# Patient Record
Sex: FEMALE | NOT HISPANIC OR LATINO | URBAN - NONMETROPOLITAN AREA
[De-identification: names, ages, dates, MRNs, and addresses within clinical notes are randomized per-mention and may not be internally consistent; named-entity substitution may affect disease eponyms.]

---

## 2018-02-15 ENCOUNTER — OFFICE VISIT CONVERTED (OUTPATIENT)
Dept: FAMILY MEDICINE CLINIC | Age: 63
End: 2018-02-15
Attending: NURSE PRACTITIONER

## 2018-06-28 ENCOUNTER — OFFICE VISIT CONVERTED (OUTPATIENT)
Dept: FAMILY MEDICINE CLINIC | Age: 63
End: 2018-06-28
Attending: NURSE PRACTITIONER

## 2018-09-28 ENCOUNTER — OFFICE VISIT CONVERTED (OUTPATIENT)
Dept: FAMILY MEDICINE CLINIC | Age: 63
End: 2018-09-28
Attending: NURSE PRACTITIONER

## 2018-10-19 ENCOUNTER — OFFICE VISIT CONVERTED (OUTPATIENT)
Dept: FAMILY MEDICINE CLINIC | Age: 63
End: 2018-10-19
Attending: NURSE PRACTITIONER

## 2018-12-27 ENCOUNTER — OFFICE VISIT CONVERTED (OUTPATIENT)
Dept: FAMILY MEDICINE CLINIC | Age: 63
End: 2018-12-27
Attending: NURSE PRACTITIONER

## 2021-05-18 NOTE — PROGRESS NOTES
Freda Jackson 1955     Office/Outpatient Visit    Visit Date: Fri, Oct 19, 2018 10:18 am    Provider: Freda Dasilva N.P. (Assistant: Shaista Marr MA)    Location: [facility]        Electronically signed by Freda Dasilva N.P. on  10/19/2018 11:05:42 AM                             SUBJECTIVE:        CC:     Juhi is a 62 year old White female.  This is a follow-up visit.  patient presents today for follow up;         HPI:         Patient to be evaluated for hypertension.  Pt states doing well on med. Her bp at home was better than today's. She is usually 120;s systolic.          Concerning essential hypercholesterolemia, pt states doing well on med. Here for f/u and refills.          Type 2 diabetes details; pt states blood sugar has been around 120's. Doing well on med. Here for f/u and refills.      ROS:     CONSTITUTIONAL:  Negative for chills, fatigue, fever, and weight change.      EYES:  Negative for blurred vision.      CARDIOVASCULAR:  Negative for chest pain, orthopnea, paroxysmal nocturnal dyspnea and pedal edema.      RESPIRATORY:  Negative for dyspnea.      GASTROINTESTINAL:  Negative for abdominal pain, constipation, diarrhea, nausea and vomiting.      NEUROLOGICAL:  Negative for dizziness, headaches, paresthesias, and weakness.      PSYCHIATRIC:  Negative for anxiety, depression, and sleep disturbances.          PMH/FMH/SH:     Last Reviewed on 10/19/2018 10:37 AM by Freda Dasilva    Past Medical History:          Menopause without estrogen replacement         Surgical History:         Cholecystectomy: at age 28;     BTL;    hysterectomy;      C section X2;     L hip arthroplasty 2018;         Family History:         Positive for Coronary Artery Disease ( brother ) and Myocardial Infarction ( brother ).      Positive for uterine cancer in mother.      Positive for Type 1 Diabetes ( mother ) and diabetic coma in mother.          Social History:      Occupation:  at [employer] in [place]     Marital Status:      Children: 2 children         Tobacco/Alcohol/Supplements:     Last Reviewed on 10/19/2018 10:37 AM by Freda Dasilva    Tobacco: She has never smoked.          Substance Abuse History:     Last Reviewed on 10/19/2018 10:37 AM by Freda Dasilva        Mental Health History:     Last Reviewed on 10/19/2018 10:37 AM by Freda Dasilva        Communicable Diseases (eg STDs):     Last Reviewed on 10/19/2018 10:37 AM by Freda Dasilva            Current Problems:     Last Reviewed on 10/19/2018 10:37 AM by Freda Dasilva    Hip pain     Vitamin D deficiency     kidney stones     Hypertension     Generalized osteoarthritis     Essential hypercholesterolemia     Type 2 diabetes     UTI         Immunizations:     Hep A, adult dose 5/17/2018         Allergies:     Last Reviewed on 10/19/2018 10:37 AM by Freda Dasilva      No Known Drug Allergies.         Current Medications:     Last Reviewed on 10/19/2018 10:37 AM by Freda Dasilva    Lisinopril 10mg Tablet 1 in am     Lovastatin 40mg Tablet 1 tablet daily     Glucophage 500mg Tablet Take 2 tablet(s) in am and 1 in pm         OBJECTIVE:        Vitals:         Current: 10/19/2018 10:22:32 AM    Ht:  5 ft, 4.7 in;  Wt: 189.2 lbs;  BMI: 31.8    T: 98.7 F (oral);  BP: 141/54 mm Hg (left arm, sitting);  P: 90 bpm (left arm (BP Cuff), sitting);  sCr: 0.67 mg/dL;  GFR: 95.09        Exams:     PHYSICAL EXAM:     GENERAL: vital signs recorded - well developed, well nourished;  no apparent distress;     EYES: extraocular movements intact; conjunctiva and cornea are normal; PERRLA;     NECK: range of motion is normal; thyroid is non-palpable;     RESPIRATORY: normal respiratory rate and pattern with no distress; normal breath sounds with no rales, rhonchi, wheezes or rubs;     CARDIOVASCULAR: normal rate; rhythm is regular;  no systolic murmur; no edema;     GASTROINTESTINAL: nontender;  normal bowel sounds; no organomegaly;     NEUROLOGICAL:  cranial nerves, motor and sensory function, reflexes, gait and coordination are all intact;     PSYCHIATRIC:  appropriate affect and demeanor; normal speech pattern; grossly normal memory;     Left foot exam    Protective sensation using Monofilament test: NORMAL sensation. Patient detects .07 grams of force which is considered normal.    Vascular status: normal peripheral vascular exam with palpable dorsal pedal and posterior tibal pulses and brisk digital capillary refill    Skin is intact without sores or ulcers    Right foot exam    Protective sensation using Monofilament test: NORMAL sensation. Patient detects .07 grams of force which is considered normal.    Vascular status: normal peripheral vascular exam with palpable dorsal pedal and posterior tibal pulses and brisk digital capillary refill    Skin is intact without sores or ulcers         Procedures:     Influenza immunization     1. Influenza, seasonal PF (children 3 years to adult): 0.5 ml unit dose given IM in the left upper arm; administered by AS;  lot number [id number]; expires 06/30/19 Regarding contraindications to an Influenza vaccine: Denies moderate/severe illness with/without fever; serious reaction to eggs, egg proteins, gentamicin, gelatin, arginine, neomycin or polymixin; serious reaction after recieving previous influenza vaccines; and history of Guillain-Canby Syndrome.              ASSESSMENT:           401.1   I10  Hypertension              DDx:     272.0   E78.2  Essential hypercholesterolemia              DDx:     250.00   E11.9  Type 2 diabetes              DDx:     V05.9   Z23  Influenza immunization              DDx:         ORDERS:         Meds Prescribed:       Refill of: Lisinopril 10mg Tablet 1 in am  #90 (Ninety) tablet(s) Refills: 1       Refill of: Lovastatin 40mg Tablet 1 tablet daily  #900 ([place]) tablet(s) Refills: 1       Refill of: Glucophage (Metformin HCl)  500mg Tablet Take 2 tablet(s) in am and 1 in pm  #270 ([place]) tablet(s) Refills: 1         Lab Orders:       75561  BDCBC - [facility] CBC with 3 part diff  (Send-Out)         70829  COMP - [facility] Comp. Metabolic Panel  (Send-Out)         67507  LPDP - [facility] Lipid Panel  (Send-Out)         37430  TSH - [facility] TSH  (Send-Out)         67990  A1CEG - [facility] Hemoglobin A1C  (Send-Out)         APPTO  Appointment need  (In-House)           Procedures Ordered:       [id number]  Immunization administration; one vaccine  (In-House)           Other Orders:       83090  Influenza virus vaccine, quadrivalent, split virus, preservative free 3 years of age & older  (In-House)         2028F  Foot examination performed (includes examination through visual inspection, sensory exam with monofi  (In-House)                   PLAN:          Hypertension     LABORATORY:  Labs ordered to be performed today include CBC, Comprehensive metabolic panel, lipid panel, and TSH.      FOLLOW-UP: Schedule a follow-up visit in 6 months..            Prescriptions:       Refill of: Lisinopril 10mg Tablet 1 in am  #90 (Ninety) tablet(s) Refills: 1           Orders:       [id number]  BDCB - [facility] CBC with 3 part diff  (Send-Out)         34590  COMP [facility] Comp. Metabolic Panel  (Send-Out)         [facility]  [facility]  [facility]         13010  TSH [facility] TSH  (Send-Out)         APPTO  Appointment need  (In-House)            Essential hypercholesterolemia           Prescriptions:       Refill of: Lovastatin 40mg Tablet 1 tablet daily  #900 ([place]) tablet(s) Refills: 1           Patient Education Handouts:       High Blood Pressure (HTN)           Type 2 diabetes     LABORATORY:  Labs ordered to be performed today include HgbA1C.            Prescriptions:       Refill of: Glucophage (Metformin HCl) 500mg Tablet Take 2 tablet(s) in am and 1 in pm  #270 ([place] Seventy) tablet(s) Refills: 1           Orders:       58211  A1CEG - [facility] Hemoglobin  A1C  (Send-Out)            Influenza immunization           Orders:       66506  Influenza virus vaccine, quadrivalent, split virus, preservative free 3 years of age & older  (In-House)         42008  Immunization administration; one vaccine  (In-House)               Other Orders:       2028F  Foot examination performed (includes examination through visual inspection, sensory exam with monofi  (In-House)           Patient Recommendations:        For  Hypertension:     Schedule a follow-up visit in 6 months.                APPOINTMENT INFORMATION:        Monday Tuesday Wednesday Thursday Friday Saturday Sunday            Time:___________________AM  PM   Date:_____________________             CHARGE CAPTURE:           Primary Diagnosis:     401.1 Hypertension            I10    Essential (primary) hypertension              Orders:          92051   Office/outpatient visit; established patient, level 4  (In-House)             APPTO   Appointment need  (In-House)           272.0 Essential hypercholesterolemia            E78.2    Mixed hyperlipidemia    250.00 Type 2 diabetes            E11.9    Type 2 diabetes mellitus without complications    V05.9 Influenza immunization            Z23    Encounter for immunization              Orders:          76923   Influenza virus vaccine, quadrivalent, split virus, preservative free 3 years of age & older  (In-House)             18169   Immunization administration; one vaccine  (In-House)               Other Orders:           2028F   Foot examination performed (includes examination through visual inspection, sensory exam with monofi  (In-House)

## 2021-05-18 NOTE — PROGRESS NOTES
Freda Jackson 1955     Office/Outpatient Visit    Visit Date: Fri, Sep 28, 2018 03:46 pm    Provider: Freda Dasilva N.P. (Assistant: Lesia Mcpherson MA)    Location: [facility]        Electronically signed by Freda Dasilva N.P. on  10/01/2018 08:41:48 AM                             SUBJECTIVE:        CC:     Juhi is a 62 year old White female.  Patient is here for bladder pressure and reoccurent UTI's.;         HPI:         Dysuria: Pt states dysuria x 3-4 days. States urinary frequency, odor, dark. States taking cranberry pills with no relief. No fever, abd pain or nausa.      ROS:     CONSTITUTIONAL:  Negative for chills, fatigue, fever, and weight change.      CARDIOVASCULAR:  Negative for chest pain, palpitations, tachycardia, orthopnea, and edema.      RESPIRATORY:  Negative for cough, dyspnea, and hemoptysis.      GASTROINTESTINAL:  Negative for abdominal pain, heartburn, constipation, diarrhea, and stool changes.      NEUROLOGICAL:  Negative for dizziness, headaches, paresthesias, and weakness.      PSYCHIATRIC:  Negative for anxiety, depression, and sleep disturbances.          PMH/FMH/SH:     Last Reviewed on 2018 04:11 PM by Freda Dasilva    Past Medical History:          Menopause without estrogen replacement         Surgical History:         Cholecystectomy: at age 28;     BTL;    hysterectomy;      C section X2;     L hip arthroplasty 2018;         Family History:         Positive for Coronary Artery Disease ( brother ) and Myocardial Infarction ( brother ).      Positive for uterine cancer in mother.      Positive for Type 1 Diabetes ( mother ) and diabetic coma in mother.          Social History:     Occupation:  at [employer] in [place]     Marital Status:      Children: 2 children         Tobacco/Alcohol/Supplements:     Last Reviewed on 2018 04:11 PM by Freda Dasilva    Tobacco: She has never smoked.          Substance Abuse  History:     Last Reviewed on 9/28/2018 04:11 PM by Freda Dasilva        Mental Health History:     Last Reviewed on 9/28/2018 04:11 PM by Freda Dasilva        Communicable Diseases (eg STDs):     Last Reviewed on 9/28/2018 04:11 PM by Freda Dasilva            Current Problems:     Last Reviewed on 9/28/2018 04:11 PM by Freda Dasilva    History of recurrent UTI's     Hip pain     Vitamin D deficiency     kidney stones     Hypertension     Generalized osteoarthritis     Essential hypercholesterolemia     Type 2 diabetes         Immunizations:     Hep A, adult dose 5/17/2018         Allergies:     Last Reviewed on 9/28/2018 04:11 PM by Freda Dasilva      No Known Drug Allergies.         Current Medications:     Last Reviewed on 9/28/2018 04:11 PM by Freda Dasilva    Lisinopril 10mg Tablet 1 in am     Lovastatin 40mg Tablet 1 tablet daily     Glucophage 500mg Tablet Take 2 tablet(s) in am and 1 in pm         OBJECTIVE:        Vitals:         Current: 9/28/2018 3:50:29 PM    Ht:  5 ft, 4.7 in;  Wt: 189 lbs;  BMI: 31.7    T: 98.1 F (oral);  BP: 153/55 mm Hg (left arm, sitting);  P: 93 bpm (left arm (BP Cuff), sitting);  sCr: 0.67 mg/dL;  GFR: 95.05        Exams:     PHYSICAL EXAM:     GENERAL:  well developed and nourished; appropriately groomed; in no apparent distress;     RESPIRATORY:  lungs clear to auscultation and percussion; symmetric expansion; no dyspnea;     CARDIOVASCULAR: regular rate and rhythm; normal S1, S2; no murmur, rub, or gallop; normal PMI;     GASTROINTESTINAL: nontender, nondistended; no hepatosplenomegaly or masses; no bruits; nontender;     MUSCULOSKELETAL:  Normal range of motion, strength and tone;     NEUROLOGICAL:  cranial nerves, motor and sensory function, reflexes, gait and coordination are all intact;     PSYCHIATRIC:  appropriate affect and demeanor; normal speech pattern; grossly normal memory;         ASSESSMENT           599.0   N39.0  UTI              DDx:          ORDERS:         Meds Prescribed:       Macrobid (Nitrofurantoin) 100mg Capsules one BID x 7 days  #14 (Fourteen) capsule(s) Refills: 0       Pyridium (Phenazopyridine HCl) 200mg Tablet Take 1 tablet(s) by mouth tid  #6 (Six) tablet(s) Refills: 0         Lab Orders:       [id number]  BDUAM - [facility] Urinalysis, automated, with micro  (Send-Out)         APPTO  Appointment need  (In-House)                   PLAN:          UTI         FOLLOW-UP: Advised to call if there is no improvement. Schedule a follow-up visit in 3 months..   Chronic visit follow up           Prescriptions:       Macrobid (Nitrofurantoin) 100mg Capsules one BID x 7 days  #14 (Fourteen) capsule(s) Refills: 0       Pyridium (Phenazopyridine HCl) 200mg Tablet Take 1 tablet(s) by mouth tid  #6 (Six) tablet(s) Refills: 0           Orders:       [id number]  BDUAM - [facility] Urinalysis, automated, with micro  (Send-Out)         APPTO  Appointment need  (In-House)             Patient Education Handouts:       Bladder Infection (Women)              CHARGE CAPTURE           **Please note: ICD descriptions below are intended for billing purposes only and may not represent clinical diagnoses**        Primary Diagnosis:         599.0 UTI            N39.0    Urinary tract infection, site not specified              Orders:          [id number]   Office/outpatient visit; established patient, level 3  (In-House)             APPTO   Appointment need  (In-House)
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
## 2021-05-18 NOTE — PROGRESS NOTES
Freda Jackson 1955     Office/Outpatient Visit    Visit Date: Thu, Dec 27, 2018 04:24 pm    Provider: Nasrin Lucio N.P. (Assistant: Shaista Marr MA)    Location: [facility]        Electronically signed by Nasrin Lucio N.P. on  2018 07:02:01 PM                             SUBJECTIVE:        CC:     Juhi is a 63 year old White female.  presents today due to complaints of cough, body aches, fever X 2 days         HPI:         Patient to be evaluated for upper respiratory illness.  These have been present for the past 3 days.  The symptoms include body aches, productive cough and fever to 101 degrees.  She has not tried any medications for symptomatic relief.      ROS:     CONSTITUTIONAL:  Positive for fever and body acheas.      EYES:  Negative for eye drainage and eye pain.      E/N/T:  Positive for sinus pressure.   Negative for ear pain or sore throat.      CARDIOVASCULAR:  Negative for chest pain and palpitations.      RESPIRATORY:  Negative for dyspnea and frequent wheezing.      GASTROINTESTINAL:  Negative for abdominal pain and vomiting.          PMH/FMH/SH:     Last Reviewed on 10/19/2018 10:37 AM by Freda Dasilva    Past Medical History:          Menopause without estrogen replacement         Surgical History:         Cholecystectomy: at age 28;     BTL;    hysterectomy;      C section X2;     L hip arthroplasty 2018;         Family History:         Positive for Coronary Artery Disease ( brother ) and Myocardial Infarction ( brother ).      Positive for uterine cancer in mother.      Positive for Type 1 Diabetes ( mother ) and diabetic coma in mother.          Social History:     Occupation:  at [employer] in [place]     Marital Status:      Children: 2 children         Tobacco/Alcohol/Supplements:     Last Reviewed on 10/19/2018 10:37 AM by Freda Dasilva    Tobacco: She has never smoked.          Substance Abuse History:     Last Reviewed on  10/19/2018 10:37 AM by Freda Dasilva        Mental Health History:     Last Reviewed on 10/19/2018 10:37 AM by Freda Dasilva        Communicable Diseases (eg STDs):     Last Reviewed on 10/19/2018 10:37 AM by Freda Dasilva            Current Problems:     Last Reviewed on 10/19/2018 10:37 AM by Freda Dasilva    Hip pain     Vitamin D deficiency     kidney stones     Hypertension     Generalized osteoarthritis     Essential hypercholesterolemia     Type 2 diabetes         Immunizations:     Hep A, adult dose 5/17/2018     Fluzone Quadrivalent (3+ years) 10/19/2018         Allergies:     Last Reviewed on 10/19/2018 10:37 AM by Freda Dasilva      No Known Drug Allergies.         Current Medications:     Last Reviewed on 10/19/2018 10:37 AM by Freda Dasilva    Glucophage 500mg Tablet Take 2 tablet(s) in am and 1 in pm     Lisinopril 10mg Tablet 1 in am     Lovastatin 40mg Tablet 1 tablet daily         OBJECTIVE:        Vitals:         Historical:     10/19/2018  BP:   141/54 mm Hg ( (left arm, , sitting, );)     09/28/2018  BP:   153/55 mm Hg ( (left arm, , sitting, );)     06/28/2018  BP:   132/66 mm Hg ( (left arm, , standing, );)         Current: 12/27/2018 4:27:22 PM    Ht:  5 ft, 4.7 in;  Wt: 193.8 lbs;  BMI: 32.5    T: 99.7 F (oral);  BP: 153/52 mm Hg (left arm, sitting);  P: 107 bpm (left arm (BP Cuff), sitting);  sCr: 0.62 mg/dL;  GFR: 102.51    O2 Sat: 97 % (room air)        Repeat:     5:09:27 PM     BP:   158/88mm Hg (right arm, sitting)         Exams:     PHYSICAL EXAM:     GENERAL: well developed, well nourished;  no apparent distress;     EYES: PERRL, EOMI     E/N/T: EARS: external auditory canal normal;  both TMs are dull;  NOSE: normal turbinates; bilateral maxillary sinus tenderness present; OROPHARYNX: oral mucosa is normal; posterior pharynx shows no exudate and post nasal drip;     NECK: range of motion is normal; trachea is midline;     RESPIRATORY: normal respiratory rate  and pattern with no distress; normal breath sounds with no rales, rhonchi, wheezes or rubs;     CARDIOVASCULAR: normal rate; rhythm is regular;     MUSCULOSKELETAL: normal gait;     NEUROLOGIC: mental status: alert and oriented x 3; GROSSLY INTACT     PSYCHIATRIC: appropriate affect and demeanor;         Lab/Test Results:             Influenza A and B:  Negative (12/27/2018),     Performed by::  tls (12/27/2018),             Procedures:     Upper respiratory infection     1. Kenalog 40 mg given IM in the right hip; administered by AS;  lot number [id number]; expires 02/2020             ASSESSMENT           465.9   J06.9  Upper respiratory infection              DDx:     461.8   J01.90  Acute sinusitis, other              DDx:         ORDERS:         Meds Prescribed:       Promethazine with Dextromethrophan 6.25mg/15mg per 5ml Syrup 1 tsp p.o. q 4-6 hrs. prn cough/nausea  #4 (Four) oz Refills: 0       Augmentin (Amoxicillin/Clavulanate) 875mg/125mg Tablet Take 1 tablet(s) by mouth q12h for 10 days  #20 (Twenty) tablet(s) Refills: 0         Lab Orders:       [id number]  Infectious agent antigen detection by immunoassay; Influenza  (In-House)         56640-65  Infectious agent antigen detection by immunoassay; Influenza  (In-House)           Other Orders:       [id number]  Therapeutic injection  (In-House)           Kenalog, per 10 mg (x4)                 PLAN:          Upper respiratory infection         RECOMMENDATIONS given include: Push Fluids, Rest, Follow up if no improvement or worsening symptoms like high fevers, vomiting, weakness, or increasing shortness of air.    .  Steroids Kenalog 40 mg 1 ml     FOLLOW-UP: Schedule follow-up appointments on a p.r.n. basis. Chronic visit follow up School/Work Excuse for Tomorrow Saturday           Prescriptions: Advised that cough medication may cause drowsiness.       Promethazine with Dextromethrophan 6.25mg/15mg per 5ml Syrup 1 tsp p.o. q 4-6 hrs. prn cough/nausea  #4 (Four)  oz Refills: 0           Orders:       [id number]  Infectious agent antigen detection by immunoassay; Influenza  (In-House)         96060-74  Infectious agent antigen detection by immunoassay; Influenza  (In-House)         98413  Therapeutic injection  (In-House)                     Kenalog, per 10 mg (x4)          Acute sinusitis, other Above recommendations first. If no improvement, may fill wait and see antibiotic prescription that was printed for her.           Prescriptions:       Augmentin (Amoxicillin/Clavulanate) 875mg/125mg Tablet Take 1 tablet(s) by mouth q12h for 10 days  #20 (Twenty) tablet(s) Refills: 0             Patient Recommendations:        For  Upper respiratory infection:     Schedule follow-up appointments as needed.              CHARGE CAPTURE           **Please note: ICD descriptions below are intended for billing purposes only and may not represent clinical diagnoses**        Primary Diagnosis:         465.9 Upper respiratory infection            J06.9    Acute upper respiratory infection, unspecified              Orders:          88419   Office/outpatient visit; established patient, level 3  (In-House)             08665   Infectious agent antigen detection by immunoassay; Influenza  (In-House)             02027 -59  Infectious agent antigen detection by immunoassay; Influenza  (In-House)             74113   Therapeutic injection  (In-House)                                           Kenalog, per 10 mg (x4)         461.8 Acute sinusitis, other            J01.90    Acute sinusitis, unspecified

## 2021-05-18 NOTE — PROGRESS NOTES
"Freda Jackson 1955     Office/Outpatient Visit    Visit Date: Thu, Feb 15, 2018 12:00 pm    Provider: Freda Dasilva N.P. (Assistant: Caty Metzger MA)    Location: [facility]        Electronically signed by Freda Dasilva N.P. on  02/15/2018 07:15:58 PM                             SUBJECTIVE:        CC: PT also seen by Betsy NP student     Juhi is a 62 year old White female.  This is a follow-up visit.  med refills;         HPI:     PT doing well on lisinopril. denies headaches     Doing well on lovastatin.     Meloxicam helping hip arthrits but states that she still has pain and has \"catches\".     doing well on glucophage. Reports fasting blood glucose in 120s-130s.     ROS:     CONSTITUTIONAL:  Negative for chills, fatigue, fever, and weight change.      EYES:  Negative for blurred vision.      CARDIOVASCULAR:  Negative for chest pain, orthopnea, paroxysmal nocturnal dyspnea and pedal edema.      RESPIRATORY:  Negative for dyspnea.      GASTROINTESTINAL:  Negative for abdominal pain, constipation, diarrhea, nausea and vomiting.      NEUROLOGICAL:  Negative for dizziness, headaches, paresthesias, and weakness.      PSYCHIATRIC:  Negative for anxiety, depression, and sleep disturbances.          PMH/FMH/SH:     Last Reviewed on 2/15/2018 12:08 PM by Freda Dasilva    Past Medical History:          Menopause without estrogen replacement         Surgical History:         Cholecystectomy: at age 28;     BTL;    hysterectomy;      C section X2;         Family History:         Positive for Coronary Artery Disease ( brother ) and Myocardial Infarction ( brother ).      Positive for uterine cancer in mother.      Positive for Type 1 Diabetes ( mother ) and diabetic coma in mother.          Social History:     Occupation:  at [employer] in [place]     Marital Status:      Children: 2 children         Tobacco/Alcohol/Supplements:     Last Reviewed on 2/15/2018 " 12:08 PM by Freda Dasilva    Tobacco: She has never smoked.          Substance Abuse History:     Last Reviewed on 2/15/2018 12:08 PM by Freda Dasilva        Mental Health History:     Last Reviewed on 2/15/2018 12:08 PM by Freda Dasilva        Communicable Diseases (eg STDs):     Last Reviewed on 2/15/2018 12:08 PM by Freda Dasilva            Current Problems:     Last Reviewed on 2/15/2018 12:08 PM by Freda Dasilva    Hip pain     Vitamin D deficiency     kidney stones     Hypertension     Generalized osteoarthritis     Essential hypercholesterolemia     Type 2 diabetes         Immunizations:     None        Allergies:     Last Reviewed on 2/15/2018 12:08 PM by Freda Dasilva      No Known Drug Allergies.         Current Medications:     Last Reviewed on 2/15/2018 12:08 PM by Freda Dasilva    Glucophage 500mg Tablet Take 2 tablet(s) in am and 1 in pm     Meloxicam 15mg Tablet 1 tab daily     Lisinopril 10mg Tablet 1 in am     Lovastatin 40mg Tablet 1 tablet daily         OBJECTIVE:        Vitals:         Current: 2/15/2018 12:02:05 PM    Ht:  5 ft, 4.7 in;  Wt: 194 lbs;  BMI: 32.6    T: 98.4 F (oral);  BP: 138/71 mm Hg (left arm, standing);  P: 85 bpm (left arm (BP Cuff), standing);  sCr: 0.7 mg/dL;  GFR: 91.99        Exams:     PHYSICAL EXAM:     GENERAL: vital signs recorded - well developed, well nourished;  no apparent distress;     EYES: extraocular movements intact; conjunctiva and cornea are normal; PERRLA;     E/N/T:  normal EACs, TMs, nasal/oral mucosa, teeth, gingiva, and oropharynx;     NECK: range of motion is normal; thyroid is non-palpable;     RESPIRATORY: normal respiratory rate and pattern with no distress; normal breath sounds with no rales, rhonchi, wheezes or rubs;     CARDIOVASCULAR: normal rate; rhythm is regular;  no systolic murmur; no edema;     NEUROLOGICAL:  cranial nerves, motor and sensory function, reflexes, gait and coordination are all intact;      PSYCHIATRIC:  appropriate affect and demeanor; normal speech pattern; grossly normal memory;         ASSESSMENT:           401.1   I10  Hypertension              DDx:     272.0   E78.2  Essential hypercholesterolemia              DDx:     719.45   M25.552  Hip pain              DDx:     250.00   E11.9  Type 2 diabetes              DDx:         ORDERS:         Meds Prescribed:       Refill of: Lisinopril 10mg Tablet 1 in am  #90 (Ninety) tablet(s) Refills: 1       Refill of: Lovastatin 40mg Tablet 1 tablet daily  #90 (Ninety) tablet(s) Refills: 1       Refill of: Meloxicam 15mg Tablet 1 tab daily  #11 (Eleven) tablet(s) Refills: 0       Refill of: Glucophage (Metformin HCl) 500mg Tablet Take 2 tablet(s) in am and 1 in pm  #90 (Ninety) tablet(s) Refills: 0         Lab Orders:       FUTURE  Future order to be done at patients convenience  (Send-Out)         62142  [facility] CBC with 3 part diff  (Send-Out)         42534  COMP [facility] Comp. Metabolic Panel  (Send-Out)         FUTURE  Future order to be done at patients convenience  (Send-Out)         28691  [facility] Lipid Panel  (Send-Out)         FUTURE  Future order to be done at patients convenience  (Send-Out)         82860  [facility]1CEG [facility] Hemoglobin A1C  (Send-Out)         52437  [facility] Microablbumin, quantitative  (Send-Out)                   PLAN: Discussed need for colonoscopy and mammogram screenings.  Pt refused at this time but understands risks associated with refusing screening.          Hypertension         FOLLOW-UP: Schedule a follow-up visit in 3 months..   for well woman due now Chronic visit follow up     FOLLOW-UP TESTING #1: FOLLOW-UP LABORATORY:  Labs to be scheduled in the future include CBC and CMP.            Prescriptions:       Refill of: Lisinopril 10mg Tablet 1 in am  #90 (Ninety) tablet(s) Refills: 1           Orders:       FUTURE  Future order to be done at patients convenience  (Send-Out)         20967  [facility] CBC with 3 part  diff  (Send-Out)         58314  COMP [facility] Comp. Metabolic Panel  (Send-Out)            Essential hypercholesterolemia         FOLLOW-UP TESTING #1: FOLLOW-UP LABORATORY:  Labs to be scheduled in the future include lipid panel.            Prescriptions:       Refill of: Lovastatin 40mg Tablet 1 tablet daily  #90 (Ninety) tablet(s) Refills: 1           Orders:       FUTURE  Future order to be done at patients convenience  (Send-Out)         66047  [facility] Lipid Panel  (Send-Out)            Hip pain           Prescriptions:       Refill of: Meloxicam 15mg Tablet 1 tab daily  #11 (Eleven) tablet(s) Refills: 0          Type 2 diabetes         FOLLOW-UP TESTING #1: FOLLOW-UP LABORATORY:  Labs to be scheduled in the future include HgbA1C and Microalbumin.            Prescriptions:       Refill of: Glucophage (Metformin HCl) 500mg Tablet Take 2 tablet(s) in am and 1 in pm  #90 (Ninety) tablet(s) Refills: 0           Orders:       FUTURE  Future order to be done at patients convenience  (Send-Out)         14785  A1CEG [facility] Hemoglobin A1C  (Send-Out)         18789  [facility] Microablbumin, quantitative  (Send-Out)               Patient Recommendations:        For  Hypertension:     Schedule a follow-up visit in 3 months.                APPOINTMENT INFORMATION:        Monday Tuesday Wednesday Thursday Friday Saturday Sunday            Time:___________________AM  PM   Date:_____________________         The following laboratory testing has been ordered: CBC metabolic panel, comprehensive         For  Essential hypercholesterolemia:             The following laboratory testing has been ordered: lipid panel         For  Type 2 diabetes:             The following laboratory testing has been ordered: HgbA1C             CHARGE CAPTURE:           Primary Diagnosis:     401.1 Hypertension            I10    Essential (primary) hypertension              Orders:          84581   Office/outpatient visit; established patient,  level 4  (In-House)           272.0 Essential hypercholesterolemia            E78.2    Mixed hyperlipidemia    719.45 Hip pain            M25.552    Pain in left hip    250.00 Type 2 diabetes            E11.9    Type 2 diabetes mellitus without complications

## 2021-07-01 VITALS
TEMPERATURE: 99.7 F | HEIGHT: 65 IN | BODY MASS INDEX: 32.29 KG/M2 | SYSTOLIC BLOOD PRESSURE: 158 MMHG | DIASTOLIC BLOOD PRESSURE: 88 MMHG | OXYGEN SATURATION: 97 % | WEIGHT: 193.8 LBS | HEART RATE: 107 BPM

## 2021-07-01 VITALS
DIASTOLIC BLOOD PRESSURE: 71 MMHG | SYSTOLIC BLOOD PRESSURE: 138 MMHG | BODY MASS INDEX: 32.32 KG/M2 | HEART RATE: 85 BPM | HEIGHT: 65 IN | WEIGHT: 194 LBS | TEMPERATURE: 98.4 F

## 2021-07-01 VITALS
HEIGHT: 65 IN | SYSTOLIC BLOOD PRESSURE: 141 MMHG | WEIGHT: 189.2 LBS | TEMPERATURE: 98.7 F | BODY MASS INDEX: 31.52 KG/M2 | HEART RATE: 90 BPM | DIASTOLIC BLOOD PRESSURE: 54 MMHG

## 2021-07-01 VITALS
HEIGHT: 65 IN | HEART RATE: 87 BPM | DIASTOLIC BLOOD PRESSURE: 66 MMHG | TEMPERATURE: 97.9 F | BODY MASS INDEX: 32.44 KG/M2 | WEIGHT: 194.7 LBS | SYSTOLIC BLOOD PRESSURE: 132 MMHG

## 2021-07-01 VITALS
HEART RATE: 93 BPM | BODY MASS INDEX: 31.49 KG/M2 | WEIGHT: 189 LBS | HEIGHT: 65 IN | TEMPERATURE: 98.1 F | DIASTOLIC BLOOD PRESSURE: 55 MMHG | SYSTOLIC BLOOD PRESSURE: 153 MMHG

## 2024-08-23 ENCOUNTER — HOSPITAL ENCOUNTER (EMERGENCY)
Facility: HOSPITAL | Age: 69
Discharge: HOME | End: 2024-08-23
Attending: EMERGENCY MEDICINE
Payer: MEDICARE

## 2024-08-23 ENCOUNTER — APPOINTMENT (OUTPATIENT)
Dept: GENERAL RADIOLOGY | Facility: HOSPITAL | Age: 69
End: 2024-08-23
Payer: MEDICARE

## 2024-08-23 VITALS
RESPIRATION RATE: 20 BRPM | TEMPERATURE: 97.8 F | HEIGHT: 65 IN | OXYGEN SATURATION: 96 % | BODY MASS INDEX: 32.32 KG/M2 | DIASTOLIC BLOOD PRESSURE: 107 MMHG | HEART RATE: 108 BPM | SYSTOLIC BLOOD PRESSURE: 128 MMHG | WEIGHT: 194 LBS

## 2024-08-23 DIAGNOSIS — N39.0 ACUTE UTI: Primary | ICD-10-CM

## 2024-08-23 DIAGNOSIS — M51.36 DEGENERATIVE DISC DISEASE, LUMBAR: ICD-10-CM

## 2024-08-23 LAB
BACTERIA UR QL AUTO: ABNORMAL /HPF
BILIRUB UR QL STRIP: NEGATIVE
CLARITY UR: CLEAR
COLOR UR: YELLOW
GLUCOSE BLDC GLUCOMTR-MCNC: 132 MG/DL (ref 70–99)
GLUCOSE UR STRIP-MCNC: NEGATIVE MG/DL
HGB UR QL STRIP.AUTO: NEGATIVE
HYALINE CASTS UR QL AUTO: ABNORMAL /LPF
KETONES UR QL STRIP: ABNORMAL
LEUKOCYTE ESTERASE UR QL STRIP.AUTO: ABNORMAL
NITRITE UR QL STRIP: NEGATIVE
PH UR STRIP.AUTO: 5.5 [PH] (ref 5–8)
PROT UR QL STRIP: NEGATIVE
RBC # UR STRIP: ABNORMAL /HPF
REF LAB TEST METHOD: ABNORMAL
SP GR UR STRIP: 1.02 (ref 1–1.03)
SQUAMOUS #/AREA URNS HPF: ABNORMAL /HPF
UROBILINOGEN UR QL STRIP: ABNORMAL
WBC # UR STRIP: ABNORMAL /HPF

## 2024-08-23 PROCEDURE — 99283 EMERGENCY DEPT VISIT LOW MDM: CPT

## 2024-08-23 PROCEDURE — 87086 URINE CULTURE/COLONY COUNT: CPT

## 2024-08-23 PROCEDURE — 25010000002 KETOROLAC TROMETHAMINE PER 15 MG

## 2024-08-23 PROCEDURE — 73502 X-RAY EXAM HIP UNI 2-3 VIEWS: CPT

## 2024-08-23 PROCEDURE — 25010000002 CEFTRIAXONE PER 250 MG

## 2024-08-23 PROCEDURE — 72100 X-RAY EXAM L-S SPINE 2/3 VWS: CPT

## 2024-08-23 PROCEDURE — 25010000002 LIDOCAINE 1 % SOLUTION 10 ML VIAL

## 2024-08-23 PROCEDURE — 82948 REAGENT STRIP/BLOOD GLUCOSE: CPT

## 2024-08-23 PROCEDURE — 81001 URINALYSIS AUTO W/SCOPE: CPT

## 2024-08-23 PROCEDURE — 96372 THER/PROPH/DIAG INJ SC/IM: CPT

## 2024-08-23 RX ORDER — CEPHALEXIN 500 MG/1
500 CAPSULE ORAL 2 TIMES DAILY
Qty: 14 CAPSULE | Refills: 0 | Status: SHIPPED | OUTPATIENT
Start: 2024-08-23 | End: 2024-08-30

## 2024-08-23 RX ORDER — CYCLOBENZAPRINE HCL 10 MG
10 TABLET ORAL ONCE
Status: COMPLETED | OUTPATIENT
Start: 2024-08-23 | End: 2024-08-23

## 2024-08-23 RX ORDER — KETOROLAC TROMETHAMINE 30 MG/ML
30 INJECTION, SOLUTION INTRAMUSCULAR; INTRAVENOUS ONCE
Status: COMPLETED | OUTPATIENT
Start: 2024-08-23 | End: 2024-08-23

## 2024-08-23 RX ORDER — MELOXICAM 15 MG/1
15 TABLET ORAL
COMMUNITY
Start: 2024-06-13 | End: 2025-06-13

## 2024-08-23 RX ORDER — LOVASTATIN 40 MG
40 TABLET ORAL DAILY
COMMUNITY
Start: 2024-06-13

## 2024-08-23 RX ORDER — LISINOPRIL AND HYDROCHLOROTHIAZIDE 20; 25 MG/1; MG/1
1 TABLET ORAL DAILY
COMMUNITY
Start: 2024-06-13

## 2024-08-23 RX ORDER — CYCLOBENZAPRINE HCL 10 MG
10 TABLET ORAL 2 TIMES DAILY PRN
Qty: 10 TABLET | Refills: 0 | Status: SHIPPED | OUTPATIENT
Start: 2024-08-23 | End: 2024-08-28

## 2024-08-23 RX ORDER — GLIPIZIDE 5 MG/1
5 TABLET, FILM COATED, EXTENDED RELEASE ORAL DAILY
COMMUNITY
Start: 2024-06-13

## 2024-08-23 RX ADMIN — CEFTRIAXONE SODIUM 1 G: 1 INJECTION, POWDER, FOR SOLUTION INTRAMUSCULAR; INTRAVENOUS at 19:02

## 2024-08-23 RX ADMIN — KETOROLAC TROMETHAMINE 30 MG: 30 INJECTION, SOLUTION INTRAMUSCULAR; INTRAVENOUS at 18:05

## 2024-08-23 RX ADMIN — CYCLOBENZAPRINE HYDROCHLORIDE 10 MG: 10 TABLET, FILM COATED ORAL at 18:05

## 2024-08-23 NOTE — ED PROVIDER NOTES
Time: 6:28 PM EDT  Date of encounter:  8/23/2024  Independent Historian/Clinical History and Information was obtained by:   Patient    History is limited by: N/A    Chief Complaint: Back pain      History of Present Illness:  Patient is a 68 y.o. year old female who presents to the emergency department for evaluation of low back pain that began 3 days ago.  Patient denies injury/trauma.  Patient denies radicular symptoms.  Patient denies dysuria.  Patient denies hematuria.  Patient denies fever.  Patient denies urinary/bowel incontinence.  Patient states she is able to ambulate.      Patient Care Team  Primary Care Provider: Provider, Chelsie Known    Past Medical History:     No Known Allergies  Past Medical History:   Diagnosis Date    Diabetes mellitus     Hypertension      Past Surgical History:   Procedure Laterality Date    CHOLECYSTECTOMY      HIP SURGERY      TUBAL ABDOMINAL LIGATION       History reviewed. No pertinent family history.    Home Medications:  Prior to Admission medications    Medication Sig Start Date End Date Taking? Authorizing Provider   glipizide (GLUCOTROL XL) 5 MG ER tablet Take 1 tablet by mouth Daily. 6/13/24  Yes Emmy Leyva MD   lisinopril-hydrochlorothiazide (PRINZIDE,ZESTORETIC) 20-25 MG per tablet Take 1 tablet by mouth Daily. 6/13/24  Yes Emmy Leyva MD   lovastatin (MEVACOR) 40 MG tablet Take 1 tablet by mouth Daily. 6/13/24  Yes Emmy Leyva MD   meloxicam (MOBIC) 15 MG tablet Take 1 tablet by mouth. 6/13/24 6/13/25 Yes Emmy Leyva MD   metFORMIN (GLUCOPHAGE) 500 MG tablet Take 2 tablets by mouth. 6/13/24  Yes ProviderEmmy MD        Social History:   Social History     Tobacco Use    Smoking status: Never    Smokeless tobacco: Never   Substance Use Topics    Alcohol use: Never    Drug use: Never         Review of Systems:  Review of Systems   Constitutional:  Negative for chills and fever.   HENT:  Negative for congestion, rhinorrhea  "and sore throat.    Eyes:  Negative for pain and visual disturbance.   Respiratory:  Negative for apnea, cough, chest tightness and shortness of breath.    Cardiovascular:  Negative for chest pain and palpitations.   Gastrointestinal:  Negative for abdominal pain, diarrhea, nausea and vomiting.   Genitourinary:  Negative for difficulty urinating and dysuria.   Musculoskeletal:  Positive for back pain. Negative for joint swelling and myalgias.   Skin:  Negative for color change.   Neurological:  Negative for seizures and headaches.   Psychiatric/Behavioral: Negative.     All other systems reviewed and are negative.       Physical Exam:  BP (!) 128/107 (BP Location: Right arm, Patient Position: Sitting)   Pulse 108   Temp 97.8 °F (36.6 °C) (Oral)   Resp 20   Ht 165.1 cm (65\")   Wt 88 kg (194 lb 0.1 oz)   SpO2 96%   BMI 32.28 kg/m²     Physical Exam  Vitals and nursing note reviewed.   Constitutional:       General: She is not in acute distress.     Appearance: Normal appearance. She is not toxic-appearing.   HENT:      Head: Normocephalic and atraumatic.      Jaw: There is normal jaw occlusion.   Eyes:      General: Lids are normal.      Extraocular Movements: Extraocular movements intact.      Conjunctiva/sclera: Conjunctivae normal.      Pupils: Pupils are equal, round, and reactive to light.   Cardiovascular:      Rate and Rhythm: Normal rate and regular rhythm.      Pulses: Normal pulses.      Heart sounds: Normal heart sounds.   Pulmonary:      Effort: Pulmonary effort is normal. No respiratory distress.      Breath sounds: Normal breath sounds. No wheezing or rhonchi.   Abdominal:      General: Abdomen is flat.      Palpations: Abdomen is soft.      Tenderness: There is no abdominal tenderness. There is no guarding or rebound.   Musculoskeletal:         General: Tenderness (Mild lumbar paraspinal tenderness appreciated upon palpation.  Mild right hip tenderness appreciated upon palpation.  No skin color " changes or warmth appreciated.) present. Normal range of motion.      Cervical back: Normal range of motion and neck supple.      Right lower leg: No edema.      Left lower leg: No edema.   Skin:     General: Skin is warm and dry.   Neurological:      Mental Status: She is alert and oriented to person, place, and time. Mental status is at baseline.   Psychiatric:         Mood and Affect: Mood normal.                  Procedures:  Procedures      Medical Decision Making:      Comorbidities that affect care:    Diabetes, hypertension    External Notes reviewed:          The following orders were placed and all results were independently analyzed by me:  Orders Placed This Encounter   Procedures    Urine Culture - Urine,    XR Spine Lumbar 2 or 3 View    XR Hip With or Without Pelvis 2 - 3 View Right    Urinalysis With Microscopic If Indicated (No Culture) - Urine, Clean Catch    Urinalysis, Microscopic Only - Urine, Clean Catch    POC Glucose STAT    POC Glucose Once       Medications Given in the Emergency Department:  Medications   ketorolac (TORADOL) injection 30 mg (30 mg Intramuscular Given 8/23/24 1805)   cyclobenzaprine (FLEXERIL) tablet 10 mg (10 mg Oral Given 8/23/24 1805)   cefTRIAXone (ROCEPHIN) 350 mg/ml in lidocaine 1% IM syringe (1 gm vial) (1 g Intramuscular Given 8/23/24 1902)        ED Course:         Labs:    Lab Results (last 24 hours)       Procedure Component Value Units Date/Time    Urinalysis With Microscopic If Indicated (No Culture) - Urine, Clean Catch [[id number]]  (Abnormal) Collected: 08/23/24 1805    Specimen: Urine, Clean Catch Updated: 08/23/24 1815     Color, UA Yellow     Appearance, UA Clear     pH, UA 5.5     Specific Gravity, UA 1.016     Glucose, UA Negative     Ketones, UA Trace     Bilirubin, UA Negative     Blood, UA Negative     Protein, UA Negative     Leuk Esterase, UA Large (3+)     Nitrite, UA Negative     Urobilinogen, UA 0.2 E.U./dL    Urinalysis, Microscopic Only -  Urine, Clean Catch [[id number]]  (Abnormal) Collected: 08/23/24 1805    Specimen: Urine, Clean Catch Updated: 08/23/24 1815     RBC, UA 0-2 /HPF      WBC, UA Too Numerous to Count /HPF      Bacteria, UA None Seen /HPF      Squamous Epithelial Cells, UA 0-2 /HPF      Hyaline Casts, UA 0-2 /LPF      Methodology Automated Microscopy    Urine Culture - Urine, Urine, Clean Catch [[id number]] Collected: 08/23/24 1805    Specimen: Urine, Clean Catch Updated: 08/23/24 1818    POC Glucose Once [[id number]]  (Abnormal) Collected: 08/23/24 1811    Specimen: Blood Updated: 08/23/24 1813     Glucose 132 mg/dL      Comment: Serial Number: [id number]:  [id number]                Imaging:    XR Spine Lumbar 2 or 3 View    Result Date: 8/23/2024  XR SPINE LUMBAR 2 OR 3 VW, XR HIP W OR WO PELVIS 2-3 VIEW RIGHT Date of Exam: 8/23/2024 6:20 PM EDT Indication: pain, no injury Comparison: None available. Findings: Lumbar spine: There are 5 nonrib-bearing lumbar-type vertebral bodies. Lumbar vertebral bodies demonstrate adequate height and alignment. There is multilevel disc space narrowing, greatest at L5-S1. Multilevel endplate osteophytes are also noted, largest  on the right at L1-L2. Facet arthropathy is greatest in the lower lumbar spine. There are atherosclerotic vascular calcifications. Right hip: No acute fracture or dislocation is identified. There is mild narrowing of the right hip joint. Postoperative changes from left hip arthroplasty are included on AP view of the pelvis.     Impression: 1.No acute osseous abnormality is identified on lumbar spine or right hip x-rays. 2.Mild to moderate degenerative changes in the lumbar spine. 3.Mild arthritic change of the right hip. Electronically Signed: Nadira Garzon  8/23/2024 6:58 PM EDT  Workstation ID: [id number]    XR Hip With or Without Pelvis 2 - 3 View Right    Result Date: 8/23/2024  XR SPINE LUMBAR 2 OR 3 VW, XR HIP W OR WO PELVIS 2-3 VIEW RIGHT Date of Exam: 8/23/2024 6:20 PM  EDT Indication: pain, no injury Comparison: None available. Findings: Lumbar spine: There are 5 nonrib-bearing lumbar-type vertebral bodies. Lumbar vertebral bodies demonstrate adequate height and alignment. There is multilevel disc space narrowing, greatest at L5-S1. Multilevel endplate osteophytes are also noted, largest  on the right at L1-L2. Facet arthropathy is greatest in the lower lumbar spine. There are atherosclerotic vascular calcifications. Right hip: No acute fracture or dislocation is identified. There is mild narrowing of the right hip joint. Postoperative changes from left hip arthroplasty are included on AP view of the pelvis.     Impression: 1.No acute osseous abnormality is identified on lumbar spine or right hip x-rays. 2.Mild to moderate degenerative changes in the lumbar spine. 3.Mild arthritic change of the right hip. Electronically Signed: Nadira Garzon  8/23/2024 6:58 PM EDT  Workstation ID: [id number]       Differential Diagnosis and Discussion:    Back Pain: The patient presents with back pain. My differential diagnosis includes but is not limited to acute spinal epidural abscess, acute spinal epidural bleed, cauda equina syndrome, abdominal aortic aneurysm, aortic dissection, kidney stone, pyelonephritis, musculoskeletal back pain, spinal fracture, and osteoarthritis.     All labs were reviewed and interpreted by me.  All X-rays impressions were independently interpreted by me.    MDM     Amount and/or Complexity of Data Reviewed  Clinical lab tests: reviewed       Analysis shows evidence of UTI.  I gave patient a gram Rocephin in the ED.  Send off urine culture was sent home on Keflex.  Will send patient home with muscle relaxers as well.  Instructed patient to return to ED if she develops any new or worsening symptoms.  Patient states she understands and agrees to plan of care.    X-rays show arthritis and degenerative disc changes.          Patient Care  Considerations:          Consultants/Shared Management Plan:        Social Determinants of Health:    Patient is independent, reliable, and has access to care.       Disposition and Care Coordination:    Discharged: The patient is suitable and stable for discharge with no need for consideration of admission.    I have explained the patient´s condition, diagnoses and treatment plan based on the information available to me at this time. I have answered questions and addressed any concerns. The patient has a good  understanding of the patient´s diagnosis, condition, and treatment plan as can be expected at this point. The vital signs have been stable. The patient´s condition is stable and appropriate for discharge from the emergency department.      The patient will pursue further outpatient evaluation with the primary care physician or other designated or consulting physician as outlined in the discharge instructions. They are agreeable to this plan of care and follow-up instructions have been explained in detail. The patient has received these instructions in written format and has expressed an understanding of the discharge instructions. The patient is aware that any significant change in condition or worsening of symptoms should prompt an immediate return to this or the closest emergency department or call to 911.  I have explained discharge medications and the need for follow up with the patient/caretakers. This was also printed in the discharge instructions. Patient was discharged with the following medications and follow up:      Medication List        New Prescriptions      cephalexin 500 MG capsule  Commonly known as: KEFLEX  Take 1 capsule by mouth 2 (Two) Times a Day for 7 days.     cyclobenzaprine 10 MG tablet  Commonly known as: FLEXERIL  Take 1 tablet by mouth 2 (Two) Times a Day As Needed for Muscle Spasms for up to 5 days.               Where to Get Your Medications        These medications were sent to  [facility] [address] [place], [place] - [address] [facility] - [phone]  - [phone] FX  [address] [facility], [facility] [address]      Phone: [phone]   cephalexin 500 MG capsule  cyclobenzaprine 10 MG tablet      Provider, No Known  [facility]  [place] [address]    Call in 1 day  To schedule follow-up       Final diagnoses:   Acute UTI   Degenerative disc disease, lumbar        ED Disposition       ED Disposition   Discharge    Condition   Stable    Comment   --               This medical record created using voice recognition software.             Pranav Pendleton PA-C  08/23/24 8087

## 2024-08-24 LAB — BACTERIA SPEC AEROBE CULT: NORMAL
